# Patient Record
Sex: FEMALE | Race: WHITE | NOT HISPANIC OR LATINO | Employment: UNEMPLOYED | URBAN - METROPOLITAN AREA
[De-identification: names, ages, dates, MRNs, and addresses within clinical notes are randomized per-mention and may not be internally consistent; named-entity substitution may affect disease eponyms.]

---

## 2021-01-25 ENCOUNTER — TELEPHONE (OUTPATIENT)
Dept: NEUROSURGERY | Facility: CLINIC | Age: 71
End: 2021-01-25

## 2021-02-01 ENCOUNTER — OFFICE VISIT (OUTPATIENT)
Dept: OTOLARYNGOLOGY | Facility: CLINIC | Age: 71
End: 2021-02-01
Payer: MEDICARE

## 2021-02-01 ENCOUNTER — CLINICAL SUPPORT (OUTPATIENT)
Dept: AUDIOLOGY | Facility: CLINIC | Age: 71
End: 2021-02-01
Payer: MEDICARE

## 2021-02-01 VITALS — WEIGHT: 232.38 LBS | BODY MASS INDEX: 41.16 KG/M2

## 2021-02-01 DIAGNOSIS — M26.609 TEMPOROMANDIBULAR JOINT DYSFUNCTION: ICD-10-CM

## 2021-02-01 DIAGNOSIS — H90.A32 MIXED CONDUCTIVE AND SENSORINEURAL HEARING LOSS OF LEFT EAR WITH RESTRICTED HEARING OF RIGHT EAR: Primary | ICD-10-CM

## 2021-02-01 DIAGNOSIS — G96.01 CEREBROSPINAL FLUID OTORRHEA: ICD-10-CM

## 2021-02-01 PROCEDURE — 92557 COMPREHENSIVE HEARING TEST: CPT | Mod: PBBFAC | Performed by: AUDIOLOGIST

## 2021-02-01 PROCEDURE — 99999 PR PBB SHADOW E&M-EST. PATIENT-LVL III: ICD-10-PCS | Mod: PBBFAC,,, | Performed by: OTOLARYNGOLOGY

## 2021-02-01 PROCEDURE — 99205 OFFICE O/P NEW HI 60 MIN: CPT | Mod: S$PBB,,, | Performed by: OTOLARYNGOLOGY

## 2021-02-01 PROCEDURE — 92567 TYMPANOMETRY: CPT | Mod: PBBFAC | Performed by: AUDIOLOGIST

## 2021-02-01 PROCEDURE — 99999 PR PBB SHADOW E&M-EST. PATIENT-LVL III: CPT | Mod: PBBFAC,,, | Performed by: OTOLARYNGOLOGY

## 2021-02-01 PROCEDURE — 99205 PR OFFICE/OUTPT VISIT, NEW, LEVL V, 60-74 MIN: ICD-10-PCS | Mod: S$PBB,,, | Performed by: OTOLARYNGOLOGY

## 2021-02-01 PROCEDURE — 99213 OFFICE O/P EST LOW 20 MIN: CPT | Mod: PBBFAC | Performed by: OTOLARYNGOLOGY

## 2022-01-27 ENCOUNTER — TELEPHONE (OUTPATIENT)
Dept: OTOLARYNGOLOGY | Facility: CLINIC | Age: 72
End: 2022-01-27
Payer: MEDICARE

## 2022-01-27 NOTE — TELEPHONE ENCOUNTER
Spoke with pt's  to schedule an appointment for a follow up.  states pt is having ear pain again. Appointment scheduled, pt happy with date and time.

## 2022-02-09 ENCOUNTER — OFFICE VISIT (OUTPATIENT)
Dept: OTOLARYNGOLOGY | Facility: CLINIC | Age: 72
End: 2022-02-09
Payer: MEDICARE

## 2022-02-09 VITALS — BODY MASS INDEX: 44.36 KG/M2 | WEIGHT: 250.44 LBS

## 2022-02-09 DIAGNOSIS — M26.609 TEMPOROMANDIBULAR JOINT DYSFUNCTION: ICD-10-CM

## 2022-02-09 DIAGNOSIS — G96.01 CEREBROSPINAL FLUID OTORRHEA: Primary | ICD-10-CM

## 2022-02-09 PROCEDURE — 99213 OFFICE O/P EST LOW 20 MIN: CPT | Mod: PBBFAC | Performed by: OTOLARYNGOLOGY

## 2022-02-09 PROCEDURE — 99214 OFFICE O/P EST MOD 30 MIN: CPT | Mod: S$PBB,,, | Performed by: OTOLARYNGOLOGY

## 2022-02-09 PROCEDURE — 99214 PR OFFICE/OUTPT VISIT, EST, LEVL IV, 30-39 MIN: ICD-10-PCS | Mod: S$PBB,,, | Performed by: OTOLARYNGOLOGY

## 2022-02-09 PROCEDURE — 99999 PR PBB SHADOW E&M-EST. PATIENT-LVL III: ICD-10-PCS | Mod: PBBFAC,,, | Performed by: OTOLARYNGOLOGY

## 2022-02-09 PROCEDURE — 99999 PR PBB SHADOW E&M-EST. PATIENT-LVL III: CPT | Mod: PBBFAC,,, | Performed by: OTOLARYNGOLOGY

## 2022-02-09 NOTE — PROGRESS NOTES
Subjective:       Patient ID: Farheen Mckinney is a 71 y.o. female.    Chief Complaint: Follow-up    Follow-up  Pertinent negatives include no abdominal pain, arthralgias, chest pain, chills, congestion, coughing, fatigue, fever, headaches, joint swelling, myalgias, nausea, neck pain, numbness, rash, sore throat, vomiting or weakness.   : Hx of L MCF repair CSF leak 2016.    Referred again for her chronic L CSF in mastoid/ TMJ pain.    CT reveals severe TMJ degeneration    Now with L Otalgia.    Has prog HL.    No DC.    CT with fluid AS.    Has MMP. Morbid obesity    Past Medical History: Patient has a past medical history of Diabetes mellitus (09/2003), Disorder of kidney and ureter, Dizziness, Hearing loss, Hyperlipidemia, Hypertension, Liver disease, Mental disorder, Osteoporosis, Stroke, and Thyroid disease.    Past Surgical History: Patient has a past surgical history that includes knee replaced (Bilateral); Joint replacement (Bilateral); and Brain surgery (1/13/16).    Social History: Patient reports that she has never smoked. She has never used smokeless tobacco. She reports that she does not drink alcohol.    Family History: family history is not on file.    Medications:   Current Outpatient Medications   Medication Sig    allopurinol (ZYLOPRIM) 300 MG tablet Take 150 mg by mouth once daily.    amlodipine (NORVASC) 5 MG tablet Take 5 mg by mouth.    aspirin (ECOTRIN) 81 MG EC tablet Take 81 mg by mouth once daily.    atorvastatin (LIPITOR) 20 MG tablet Take 20 mg by mouth once daily.    bisacodyl (DULCOLAX) 5 mg EC tablet Take 5 mg by mouth 2 (two) times daily.    cephALEXin (KEFLEX) 500 MG capsule Take 500 mg by mouth 4 (four) times daily as needed. DENTIST    dextromethorphan-quinidine 20-10 mg Cap Take 1 capsule by mouth 2 (two) times daily.    docusate sodium (COLACE) 100 MG capsule Take 100 mg by mouth 2 (two) times daily.    esomeprazole (NEXIUM) 20 MG capsule Take 20 mg by mouth once daily.     fenofibrate (TRICOR) 48 MG tablet Take 48 mg by mouth once daily.    gabapentin (NEURONTIN) 300 MG capsule Take 2 capsules by mouth 3 (three) times daily.    glimepiride (AMARYL) 2 MG tablet Take 2 mg by mouth before breakfast.    hydrocodone-acetaminophen 7.5-325mg (NORCO) 7.5-325 mg per tablet Take 1 tablet by mouth 2 (two) times daily as needed for Pain.    levothyroxine (SYNTHROID) 50 MCG tablet Take 50 mcg by mouth once daily.    lidocaine (LIDODERM) 5 % Place 1 patch onto the skin every 24 hours as needed. Remove and discard patch within 1`2 hours or as directed by MD.    liraglutide 0.6 mg/0.1 mL, 18 mg/3 mL, subq PNIJ 0.6 mg/0.1 mL (18 mg/3 mL) PnIj Inject 0.6 mg into the skin once daily.    sitagliptin (JANUVIA) 25 MG Tab Take 25 mg by mouth once daily.      No current facility-administered medications for this visit.       Allergies: Patient is allergic to allegra [fexofenadine], celebrex [celecoxib], nsaids (non-steroidal anti-inflammatory drug), tolectin ds [tolmetin], and aspirin.      Review of Systems   Constitutional: Negative for appetite change, chills, fatigue and fever.   HENT: Positive for ear pain and hearing loss. Negative for nasal congestion, ear discharge, facial swelling, postnasal drip, rhinorrhea, sore throat, tinnitus and trouble swallowing.    Eyes: Negative for photophobia, pain, discharge, redness, itching and visual disturbance.   Respiratory: Negative for apnea, cough, choking, chest tightness, shortness of breath, wheezing and stridor.    Cardiovascular: Negative for chest pain and palpitations.   Gastrointestinal: Negative for abdominal pain, constipation, diarrhea, nausea and vomiting.   Musculoskeletal: Negative for arthralgias, gait problem, joint swelling, myalgias, neck pain and neck stiffness.   Integumentary:  Negative for color change, pallor, rash and wound.   Neurological: Negative for dizziness, tremors, seizures, syncope, facial asymmetry, speech  difficulty, weakness, light-headedness, numbness and headaches.   Hematological: Negative for adenopathy. Does not bruise/bleed easily.   Psychiatric/Behavioral: Negative for agitation, behavioral problems, confusion, decreased concentration, dysphoric mood, hallucinations, sleep disturbance and suicidal ideas. The patient is not nervous/anxious and is not hyperactive.          Objective:      Physical Exam  Vitals and nursing note reviewed.   Constitutional:       General: She is not in acute distress.     Appearance: Normal appearance. She is well-developed. She is not ill-appearing, toxic-appearing or diaphoretic.   HENT:      Head: Normocephalic and atraumatic. Not macrocephalic and not microcephalic. No raccoon eyes, Goldberg's sign, abrasion, contusion, right periorbital erythema, left periorbital erythema or laceration. Hair is normal.      Right Ear: Ear canal normal. Decreased hearing noted. No laceration, drainage, swelling or tenderness. No middle ear effusion. No foreign body. No mastoid tenderness. No hemotympanum. Tympanic membrane is not injected, scarred, perforated, erythematous, retracted or bulging. Tympanic membrane has normal mobility.      Left Ear: Ear canal normal. Decreased hearing noted. No laceration, drainage, swelling or tenderness. A middle ear effusion is present. No foreign body. No mastoid tenderness. No hemotympanum. Tympanic membrane is scarred. Tympanic membrane is not injected, perforated, erythematous, retracted or bulging. Tympanic membrane has decreased mobility.      Ears:        Comments:     CT with fluid density L mastoid/ ME    Tegmen W/O sig erosion.    No MRI due to implanted stimulator.    +++ L TMJ tenderness     Nose: No nasal deformity, septal deviation, laceration, mucosal edema or rhinorrhea.      Right Sinus: No maxillary sinus tenderness.      Left Sinus: No maxillary sinus tenderness.   Eyes:      General: Lids are normal.      Conjunctiva/sclera: Conjunctivae  normal.      Pupils: Pupils are equal, round, and reactive to light.   Neck:      Thyroid: No thyroid mass or thyromegaly.      Vascular: No JVD.      Trachea: No tracheal tenderness or tracheal deviation.   Cardiovascular:      Rate and Rhythm: Normal rate and regular rhythm.   Pulmonary:      Effort: Pulmonary effort is normal. No tachypnea, bradypnea, accessory muscle usage or respiratory distress.      Breath sounds: No stridor.   Abdominal:      Palpations: Abdomen is soft.   Musculoskeletal:         General: Normal range of motion.      Cervical back: Normal range of motion and neck supple. No edema, erythema or rigidity. No muscular tenderness. Normal range of motion.   Lymphadenopathy:      Head:      Right side of head: No submental, submandibular, tonsillar, preauricular or posterior auricular adenopathy.      Left side of head: No submental, submandibular, tonsillar, preauricular or posterior auricular adenopathy.      Cervical: No cervical adenopathy.      Right cervical: No superficial, deep or posterior cervical adenopathy.     Left cervical: No superficial, deep or posterior cervical adenopathy.   Skin:     General: Skin is warm and dry.      Coloration: Skin is not pale.      Findings: No abrasion, bruising, burn, ecchymosis, erythema, laceration, lesion or rash.   Neurological:      Mental Status: She is alert and oriented to person, place, and time.      Cranial Nerves: No cranial nerve deficit.      Sensory: No sensory deficit.      Motor: No tremor, atrophy, abnormal muscle tone or seizure activity.   Psychiatric:         Speech: She is communicative. Speech is not rapid and pressured or slurred.         Behavior: Behavior normal. Behavior is cooperative.         Thought Content: Thought content normal.         Judgment: Judgment normal.               Assessment:       1. Cerebrospinal fluid otorrhea    2. Temporomandibular joint dysfunction        Plan:         TMJ regimen with soft diet,  NSAID's, Heating pad over joint for 20 minutes tid for 7-10 days. If no better consider re evaluation for use of muscle relaxants and or referral to Oral Surgery specialist.    Patient to see Audiology for hearing aid evaluation.    Discussed options: revision MCF vs observation/ would prefer obs due to adv age/ MMP/ surg risk/ lack of sx's     Counseled regarding need for immediate Rx for OM to prevent CNS complications.    I have explained the risks , benefits and alternatives of this in detail. This includes infection, bleeding, further loss of hearing,tinnitus, failure to improve, chorda symptoms, dizziness and facial nerve problems. All questions have been answered.  The patient desires to proceed with obs.    F/U prn